# Patient Record
Sex: MALE | Race: WHITE | NOT HISPANIC OR LATINO | Employment: OTHER | URBAN - METROPOLITAN AREA
[De-identification: names, ages, dates, MRNs, and addresses within clinical notes are randomized per-mention and may not be internally consistent; named-entity substitution may affect disease eponyms.]

---

## 2017-01-01 ENCOUNTER — TELEPHONE (OUTPATIENT)
Dept: INTERNAL MEDICINE | Facility: CLINIC | Age: 77
End: 2017-01-01

## 2017-01-01 ENCOUNTER — OFFICE VISIT (OUTPATIENT)
Dept: INTERNAL MEDICINE | Facility: CLINIC | Age: 77
End: 2017-01-01

## 2017-01-01 ENCOUNTER — RESULTS ENCOUNTER (OUTPATIENT)
Dept: INTERNAL MEDICINE | Facility: CLINIC | Age: 77
End: 2017-01-01

## 2017-01-01 VITALS
BODY MASS INDEX: 41.52 KG/M2 | HEIGHT: 70 IN | SYSTOLIC BLOOD PRESSURE: 130 MMHG | HEART RATE: 62 BPM | DIASTOLIC BLOOD PRESSURE: 80 MMHG | OXYGEN SATURATION: 89 % | WEIGHT: 290 LBS

## 2017-01-01 DIAGNOSIS — M54.5 CHRONIC LOW BACK PAIN, UNSPECIFIED BACK PAIN LATERALITY, WITH SCIATICA PRESENCE UNSPECIFIED: Chronic | ICD-10-CM

## 2017-01-01 DIAGNOSIS — F51.04 CHRONIC INSOMNIA: Chronic | ICD-10-CM

## 2017-01-01 DIAGNOSIS — M54.5 CHRONIC LOW BACK PAIN, UNSPECIFIED BACK PAIN LATERALITY, WITH SCIATICA PRESENCE UNSPECIFIED: ICD-10-CM

## 2017-01-01 DIAGNOSIS — E78.5 HYPERLIPIDEMIA, UNSPECIFIED HYPERLIPIDEMIA TYPE: Chronic | ICD-10-CM

## 2017-01-01 DIAGNOSIS — R80.9 MICROALBUMINURIA: Chronic | ICD-10-CM

## 2017-01-01 DIAGNOSIS — J41.1 MUCOPURULENT CHRONIC BRONCHITIS (HCC): Chronic | ICD-10-CM

## 2017-01-01 DIAGNOSIS — G47.33 OBSTRUCTIVE SLEEP APNEA: Chronic | ICD-10-CM

## 2017-01-01 DIAGNOSIS — E55.9 VITAMIN D DEFICIENCY: Chronic | ICD-10-CM

## 2017-01-01 DIAGNOSIS — N40.1 BENIGN NON-NODULAR PROSTATIC HYPERPLASIA WITH LOWER URINARY TRACT SYMPTOMS: Chronic | ICD-10-CM

## 2017-01-01 DIAGNOSIS — E11.9 DIABETIC EYE EXAM (HCC): Chronic | ICD-10-CM

## 2017-01-01 DIAGNOSIS — I27.20 PULMONARY HYPERTENSION (HCC): Chronic | ICD-10-CM

## 2017-01-01 DIAGNOSIS — N18.4 CHRONIC RENAL INSUFFICIENCY, STAGE IV (SEVERE) (HCC): Chronic | ICD-10-CM

## 2017-01-01 DIAGNOSIS — M15.9 GENERALIZED OSTEOARTHRITIS: Chronic | ICD-10-CM

## 2017-01-01 DIAGNOSIS — D75.1 SECONDARY POLYCYTHEMIA: Chronic | ICD-10-CM

## 2017-01-01 DIAGNOSIS — E11.42 DIABETIC PERIPHERAL NEUROPATHY (HCC): Chronic | ICD-10-CM

## 2017-01-01 DIAGNOSIS — E03.9 HYPOTHYROIDISM, UNSPECIFIED TYPE: Chronic | ICD-10-CM

## 2017-01-01 DIAGNOSIS — E11.9 TYPE 2 DIABETES MELLITUS WITHOUT COMPLICATION, WITHOUT LONG-TERM CURRENT USE OF INSULIN (HCC): Primary | Chronic | ICD-10-CM

## 2017-01-01 DIAGNOSIS — G89.29 CHRONIC LOW BACK PAIN, UNSPECIFIED BACK PAIN LATERALITY, WITH SCIATICA PRESENCE UNSPECIFIED: Chronic | ICD-10-CM

## 2017-01-01 DIAGNOSIS — Z01.00 DIABETIC EYE EXAM (HCC): Chronic | ICD-10-CM

## 2017-01-01 DIAGNOSIS — G89.29 CHRONIC LOW BACK PAIN, UNSPECIFIED BACK PAIN LATERALITY, WITH SCIATICA PRESENCE UNSPECIFIED: ICD-10-CM

## 2017-01-01 DIAGNOSIS — R60.0 BILATERAL LOWER EXTREMITY EDEMA: Chronic | ICD-10-CM

## 2017-01-01 DIAGNOSIS — I10 BENIGN ESSENTIAL HYPERTENSION: Chronic | ICD-10-CM

## 2017-01-01 DIAGNOSIS — I87.2 VENOUS INSUFFICIENCY OF BOTH LOWER EXTREMITIES: Chronic | ICD-10-CM

## 2017-01-01 DIAGNOSIS — E11.9 TYPE 2 DIABETES MELLITUS WITHOUT COMPLICATION, WITHOUT LONG-TERM CURRENT USE OF INSULIN (HCC): Chronic | ICD-10-CM

## 2017-01-01 DIAGNOSIS — G47.34 NOCTURNAL HYPOXEMIA: Chronic | ICD-10-CM

## 2017-01-01 LAB
25(OH)D3+25(OH)D2 SERPL-MCNC: 22.7 NG/ML (ref 30–100)
ALBUMIN SERPL-MCNC: 3.6 G/DL (ref 3.5–5.2)
ALBUMIN/GLOB SERPL: 1.4 G/DL
ALP SERPL-CCNC: 38 U/L (ref 39–117)
ALT SERPL-CCNC: 15 U/L (ref 1–41)
AST SERPL-CCNC: 15 U/L (ref 1–40)
BILIRUB SERPL-MCNC: 0.3 MG/DL (ref 0.1–1.2)
BUN SERPL-MCNC: 23 MG/DL (ref 8–23)
BUN/CREAT SERPL: 13.6 (ref 7–25)
CALCIUM SERPL-MCNC: 9.2 MG/DL (ref 8.6–10.5)
CHLORIDE SERPL-SCNC: 99 MMOL/L (ref 98–107)
CHOLEST SERPL-MCNC: 150 MG/DL (ref 100–199)
CK SERPL-CCNC: 50 U/L (ref 20–200)
CO2 SERPL-SCNC: 34.5 MMOL/L (ref 22–29)
CREAT SERPL-MCNC: 1.69 MG/DL (ref 0.76–1.27)
ERYTHROCYTE [DISTWIDTH] IN BLOOD BY AUTOMATED COUNT: 14.7 % (ref 11.5–14.5)
GLOBULIN SER CALC-MCNC: 2.5 GM/DL
GLUCOSE SERPL-MCNC: 182 MG/DL (ref 65–99)
HBA1C MFR BLD: 9.4 % (ref 4.8–5.6)
HCT VFR BLD AUTO: 50.1 % (ref 40.4–52.2)
HDL SERPL-SCNC: 32.8 UMOL/L
HDLC SERPL-MCNC: 40 MG/DL
HGB BLD-MCNC: 15.6 G/DL (ref 13.7–17.6)
LDL SERPL QN: 20.6 NM
LDL SERPL-SCNC: 1649 NMOL/L
LDL SMALL SERPL-SCNC: 939 NMOL/L
LDLC SERPL CALC-MCNC: 76 MG/DL (ref 0–99)
MCH RBC QN AUTO: 30.2 PG (ref 27–32.7)
MCHC RBC AUTO-ENTMCNC: 31.1 G/DL (ref 32.6–36.4)
MCV RBC AUTO: 97.1 FL (ref 79.8–96.2)
PLATELET # BLD AUTO: 170 10*3/MM3 (ref 140–500)
POTASSIUM SERPL-SCNC: 5 MMOL/L (ref 3.5–5.2)
PROT SERPL-MCNC: 6.1 G/DL (ref 6–8.5)
RBC # BLD AUTO: 5.16 10*6/MM3 (ref 4.6–6)
SODIUM SERPL-SCNC: 143 MMOL/L (ref 136–145)
T3FREE SERPL-MCNC: 2.6 PG/ML (ref 2–4.4)
T4 FREE SERPL-MCNC: 1.18 NG/DL (ref 0.93–1.7)
TRIGL SERPL-MCNC: 172 MG/DL (ref 0–149)
TSH SERPL DL<=0.005 MIU/L-ACNC: 4.77 MIU/ML (ref 0.27–4.2)
WBC # BLD AUTO: 7.95 10*3/MM3 (ref 4.5–10.7)

## 2017-01-01 PROCEDURE — 99214 OFFICE O/P EST MOD 30 MIN: CPT | Performed by: INTERNAL MEDICINE

## 2017-01-01 RX ORDER — TIOTROPIUM BROMIDE 18 UG/1
CAPSULE ORAL; RESPIRATORY (INHALATION)
Qty: 30 CAPSULE | Refills: 5 | Status: SHIPPED | OUTPATIENT
Start: 2017-01-01

## 2017-01-01 RX ORDER — LINAGLIPTIN 5 MG/1
TABLET, FILM COATED ORAL
Qty: 90 TABLET | Refills: 0 | Status: SHIPPED | OUTPATIENT
Start: 2017-01-01 | End: 2017-01-01 | Stop reason: SDUPTHER

## 2017-01-01 RX ORDER — HYDROCODONE BITARTRATE AND ACETAMINOPHEN 10; 325 MG/1; MG/1
TABLET ORAL
Qty: 120 TABLET | Refills: 0 | Status: SHIPPED | OUTPATIENT
Start: 2017-01-01

## 2017-01-01 RX ORDER — LINAGLIPTIN 5 MG/1
TABLET, FILM COATED ORAL
Qty: 90 TABLET | Refills: 0 | Status: SHIPPED | OUTPATIENT
Start: 2017-01-01

## 2017-01-01 RX ORDER — EZETIMIBE 10 MG/1
TABLET ORAL
Qty: 90 TABLET | Refills: 0 | Status: SHIPPED | OUTPATIENT
Start: 2017-01-01 | End: 2017-01-01 | Stop reason: SDUPTHER

## 2017-01-01 RX ORDER — EZETIMIBE 10 MG/1
TABLET ORAL
Qty: 90 TABLET | Refills: 0 | Status: SHIPPED | OUTPATIENT
Start: 2017-01-01

## 2017-01-01 RX ORDER — GLIMEPIRIDE 4 MG/1
TABLET ORAL
Qty: 180 TABLET | Refills: 1 | Status: SHIPPED | OUTPATIENT
Start: 2017-01-01

## 2017-01-01 RX ORDER — BLOOD-GLUCOSE METER
EACH MISCELLANEOUS
COMMUNITY
Start: 2017-01-01 | End: 2017-01-01

## 2017-01-01 RX ORDER — LEVOTHYROXINE SODIUM 0.07 MG/1
TABLET ORAL
Qty: 93 TABLET | Refills: 3 | Status: SHIPPED | OUTPATIENT
Start: 2017-01-01

## 2017-01-01 RX ORDER — HYDROCODONE BITARTRATE AND ACETAMINOPHEN 10; 325 MG/1; MG/1
TABLET ORAL
Qty: 120 TABLET | Refills: 0 | Status: SHIPPED | OUTPATIENT
Start: 2017-01-01 | End: 2017-01-01 | Stop reason: SDUPTHER

## 2017-01-01 RX ORDER — METOPROLOL SUCCINATE 50 MG/1
TABLET, EXTENDED RELEASE ORAL
Qty: 90 TABLET | Refills: 1 | Status: SHIPPED | OUTPATIENT
Start: 2017-01-01

## 2017-01-01 RX ORDER — ROSUVASTATIN CALCIUM 40 MG/1
TABLET, COATED ORAL
Qty: 90 TABLET | Refills: 1 | Status: SHIPPED | OUTPATIENT
Start: 2017-01-01

## 2017-01-01 RX ORDER — METOPROLOL SUCCINATE 50 MG/1
TABLET, EXTENDED RELEASE ORAL
Qty: 90 TABLET | Refills: 0 | Status: SHIPPED | OUTPATIENT
Start: 2017-01-01 | End: 2017-01-01 | Stop reason: SDUPTHER

## 2017-02-21 NOTE — TELEPHONE ENCOUNTER
Home health nurse suggest BS meter and test stripes. Can we send that into pharmacy and how many times to check BS. Call Elayne at [phone].

## 2017-02-21 NOTE — TELEPHONE ENCOUNTER
Go ahead and take care of this.  He can check his blood sugar once daily in the morning fasting and check it as needed.

## 2017-02-27 PROBLEM — E11.9 ENCOUNTER FOR DIABETIC FOOT EXAM (HCC): Status: ACTIVE | Noted: 2017-01-01

## 2017-03-07 PROBLEM — E11.9 ENCOUNTER FOR DIABETIC FOOT EXAM (HCC): Chronic | Status: ACTIVE | Noted: 2017-01-01

## 2017-03-07 PROBLEM — S22.41XD CLOSED FRACTURE OF MULTIPLE RIBS OF RIGHT SIDE WITH ROUTINE HEALING: Status: ACTIVE | Noted: 2017-01-01

## 2017-03-07 NOTE — PROGRESS NOTES
03/07/2017    Patient Information  Carlos Chaney                                                                                          [address]  [facility] [address]      1940  [phone]      Chief Complaint:     Follow-up type 2 diabetes, hyperlipidemia, hypothyroidism, microalbuminuria, lower extremity edema and venous insufficiency, generalized osteoarthritis and chronic lower back pain, diabetic peripheral neuropathy, chronic renal insufficiency, COPD, BPH, hypertension, pulmonary hypertension, secondary polycythemia, nocturnal hypoxemia, CINDI, chronic insomnia.  Patient complaining of continued lower back pain.    History of Present Illness:    Patient with a history of multiple medical problems as outlined in the chief complaint that have been fairly stable over the past year.  He presents today for a routine follow-up with lab to monitor his chronic medical issues.  Currently seems to be tolerating his medications well and has no new acute complaints.  Patient has significant chronic lower back pain but is very fearful of needles/injections and refuses epidural injections.  Discussed physical therapy but he really doesn't want to pursue that at this time.  His past medical history extensively reviewed and updated where necessary including his health maintenance parameters.  Because of his fear of needles, multiple issues were declined today.    Review of Systems   Constitution: Negative.   HENT: Negative.    Eyes: Negative.    Cardiovascular: Negative.    Respiratory: Negative.    Endocrine: Negative.    Hematologic/Lymphatic: Negative.    Skin: Negative.    Musculoskeletal: Negative.    Gastrointestinal: Negative.    Genitourinary: Negative.    Neurological: Negative.    Psychiatric/Behavioral: Negative.    Allergic/Immunologic: Negative.        Active Problems:    Patient Active Problem List   Diagnosis   • Allergic rhinitis   • Benign essential hypertension   • Benign  prostatic hypertrophy   • Chronic lower back pain   • COPD   • Chronic renal insufficiency, stage IV (severe), 04/06/2016--creatinine 2.13   • Diabetic peripheral neuropathy   • Generalized osteoarthritis of multiple sites   • Hyperlipidemia   • Hypothyroidism   • Microalbuminuria   • Bilateral lower extremity edema   • Chronic insomnia   • Pulmonary hypertension, 07/01/2014--RVSP 58 mmHg.   • Secondary polycythemia   • Nocturnal hypoxemia   • Type 2 diabetes mellitus   • Vitamin D deficiency   • Therapeutic drug monitoring   • Obstructive sleep apnea, 04/17/2007--AHI 84.2.  Patient cannot tolerate CPAP.  Oxygen 2 L per nasal cannula.   • Venous insufficiency of both lower extremities   • Diabetic eye exam   • Diabetic foot exam         Past Medical History   Diagnosis Date   • History of aortic valve stenosis    • History of chest x-ray 06/30/2014 06/30/2014--chest x-ray performed 4 possible congestive heart failure. It reveals mild hydrostatic edema, pulmonary edema. Small pleural effusions noted. 09/18/2008--chest x-ray reveals no active disease.   • History of complete arterial study of extremity 08/26/2005 08/26/2005--lower extremity Doppler arterial study reveals right ankle-brachial index, posterior tibial 1.12. Right ankle-brachial index dorsalis pedis 0.98. Left ankle brachial index, posterior tibial 1.14. Left ankle brachial index dorsalis pedis 1.03.   • History of echocardiogram 07/01/2016 07/01/2014--echocardiogram performed for congestive heart failure. Left ventricular chamber size is normal. Mild to moderate concentric left ventricular hypertrophy. Normal ejection fraction 55-60%. Impaired left ventricular relaxation. Left atrium is mild moderately dilated. No evidence of atrial septal aneurysm. Right ventricular cavity size is mildly enlarged. Right ventricular global systolic    • History of Inflamed skin tag 05/28/2014 05/28/2014--patient has an extremely large skin tag on his  right anterior thigh. This was anesthetized and removed with scissors and the base of the lesion was electrocauterized.   • History of Multiple actinic keratoses 2/9/2015 02/09/2015--patient had a total of 5 actinic keratoses that were destroyed with liquid nitrogen.  One was located on the ulnar aspect of the left wrist, 3 on the left forearm, one on the superior aspect of the left ear.   • History of multiple right rib fractures 11/22/2016 11/22/2016--patient fell 2-3 weeks ago and landed on the right anterior chest wall.  Since that time he has had significant pain that is worse with certain movements.  He is concerned of the rib fracture.  Examination reveals no tenderness to palpation over this area.  Breath sounds are present.  Chest x-ray with right rib details ordered.CONCLUSION: 1. Fracture of the right 9th, 10th and 11th ribs. 2. Small associated right-sided pleural effusion.   • Right-sided chest wall pain 11/22/2016 11/22/2016--patient fell 2-3 weeks ago and landed on the right anterior chest wall.  Since that time he has had significant pain that is worse with certain movements.  He is concerned of the rib fracture.  Examination reveals no tenderness to palpation over this area.  Breath sounds are present.  Chest x-ray with right rib details ordered.CONCLUSION: 1. Fracture of the right 9th, 10th and 11th ribs. 2. Small associated right-sided pleural effusion.         Past Surgical History   Procedure Laterality Date   • Skin surgery Right 05/28/2014 05/28/2014--patient has an extremely large inflamed skin tag on his right anterior thigh. This was anesthetized and removed with scissors and the base of the lesion was electrocauterized.   • Shoulder surgery Right 1998 1998--right shoulder surgery to resect a bone spur causing impingement syndrome.   • Inguinal hernia repair Bilateral 1972 1972-bilateral inguinal hernia repair.   • Thoracic disc surgery  1992 1992--herniated  nucleus pulposus of the very upper back/lower cervical vertebrae.   • Vasectomy  1972 1972--elective vasectomy   • Cataract extraction Right 11/20/2015 11/20/2015--right cataract extirpation with intraocular lens implantation. 11/13/2015-left cataract extirpation with intraocular lens implantation   • Colonoscopy  REFUSES     Refuses colonoscopy   • Cataract extraction Left 11/13/2015 11/13/2015--left cataract extraction with intraocular lens implantation.         No Known Allergies        Current Outpatient Prescriptions:   •  aspirin 325 MG tablet, Take 1 tablet by mouth daily., Disp: , Rfl:   •  Blood Glucose Monitoring Suppl (ONE TOUCH ULTRA 2) W/DEVICE kit, , Disp: , Rfl:   •  Cholecalciferol (VITAMIN D) 2000 UNITS capsule, Take 1 tablet by mouth 2 (two) times a day., Disp: , Rfl:   •  CRESTOR 40 MG tablet, TAKE 1 TABLET BY MOUTH EVERY DAY, Disp: 90 tablet, Rfl: 1  •  fenofibrate (TRICOR) 145 MG tablet, TAKE 1 TABLET BY MOUTH EVERY DAY FOR CHOLESTEROL, Disp: 90 tablet, Rfl: 1  •  glimepiride (AMARYL) 4 MG tablet, TAKE 1 TABLET BY MOUTH TWICE DAILY FOR DIABETES, Disp: 180 tablet, Rfl: 1  •  HYDROcodone-acetaminophen (NORCO)  MG per tablet, One to 2 by mouth every 6 hours when necessary pain., Disp: 120 tablet, Rfl: 0  •  levothyroxine (SYNTHROID) 50 MCG tablet, One by mouth daily for thyroid., Disp: 90 tablet, Rfl: 3  •  metoprolol succinate XL (TOPROL-XL) 50 MG 24 hr tablet, TAKE 1 TABLET BY MOUTH EVERY DAY, Disp: 90 tablet, Rfl: 0  •  Misc Natural Products (OSTEO BI-FLEX ADV DOUBLE ST PO), Take 1 tablet by mouth daily., Disp: , Rfl:   •  Multiple Vitamin (MULTI-VITAMINS PO), Take 1 tablet by mouth daily., Disp: , Rfl:   •  ONE TOUCH ULTRA TEST test strip, , Disp: , Rfl:   •  ONETOUCH DELICA LANCETS FINE misc, , Disp: , Rfl:   •  pioglitazone (ACTOS) 45 MG tablet, TAKE 1 TABLET BY MOUTH EVERY DAY, Disp: 90 tablet, Rfl: 1  •  PROVENTIL  (90 BASE) MCG/ACT inhaler, INHALE 2 PUFFS FOUR  "TIMES DAILY AS NEEDED, Disp: 8 g, Rfl: 5  •  tiotropium (SPIRIVA) 18 MCG per inhalation capsule, 1 capsule daily., Disp: , Rfl:   •  TRADJENTA 5 MG tablet tablet, TAKE 1 TABLET BY MOUTH EVERY DAY, Disp: 90 tablet, Rfl: 0  •  ZETIA 10 MG tablet, TAKE 1 TABLET BY MOUTH EVERY DAY FOR CHOLESTEROL, Disp: 90 tablet, Rfl: 0  •  zolpidem (AMBIEN) 10 MG tablet, One by mouth daily at bedtime when necessary sleep, Disp: 30 tablet, Rfl: 2      Family History   Problem Relation Age of Onset   • Kidney failure Mother      On Hemodialysis.   • Alzheimer's disease Father    • Hypertension Other          Social History     Social History   • Marital status:      Spouse name: N/A   • Number of children: N/A   • Years of education: N/A     Occupational History   • Maintenance at [employer]    • Retired      Social History Main Topics   • Smoking status: Former Smoker     Quit date: 11/1/2013   • Smokeless tobacco: Never Used   • Alcohol use Yes      Comment: Socially   • Drug use: No   • Sexual activity: Not Currently     Partners: Female     Other Topics Concern   • Not on file     Social History Narrative         Vitals:    03/07/17 0855   BP: 130/80   Pulse: 62   SpO2: (!) 89%   Weight: 290 lb (132 kg)   Height: 69.5\" (176.5 cm)          Physical Exam:    General: Alert and oriented x 3. Obese.   No acute distress.  Normal affect.  HEENT: Pupils equal, round, reactive to light; extraocular movements intact; sclerae nonicteric; pharynx, ear canals and TMs normal.  Neck: Without JVD, thyromegaly, bruit, or adenopathy.  Lungs: Clear to auscultation in all fields, but his breath sounds are somewhat diminished bilaterally.  Heart: Regular rate and rhythm without murmur, rub, gallop, or click.  Abdomen: Soft, nontender, without hepatosplenomegaly or hernia.  Bowel sounds normal.  : Deferred.  Rectal: Deferred.  Extremities: Without clubbing, cyanosis, or pulse deficit.  Bilateral 2+ pedal edema to above the " calves.  Neurologic: Intact without focal deficit.  Normal station and gait observed during ingress and egress from the examination room.  Skin: Without significant lesion.  Musculoskeletal: Unremarkable.      Lab/other results:    NMR reveals total cholesterol 250.  Triglycerides mildly elevated at 172.  LDL particle number elevated at 1649.  Small LDL particle number elevated at 939.  HDL particle number normal at 38.2.  CMP normal except blood sugar elevated at 182.  Creatinine is 1.69.  CBC is essentially normal.  Hemoglobin A1c 9.4.  Thyroid function tests are abnormal.  His TSH is elevated at 4.77.  Vitamin D low at 22.7.  CPK normal.    Assessment/Plan:     Diagnosis Plan   1. Type 2 diabetes mellitus without complication, without long-term current use of insulin     2. Hyperlipidemia, unspecified hyperlipidemia type     3. Hypothyroidism, unspecified type     4. Microalbuminuria     5. Bilateral lower extremity edema     6. Generalized osteoarthritis of multiple sites     7. Diabetic peripheral neuropathy     8. Chronic renal insufficiency, stage IV (severe), 04/06/2016--creatinine 2.13     9. COPD     10. Chronic low back pain, unspecified back pain laterality, with sciatica presence unspecified     11. Benign prostatic hypertrophy     12. Benign essential hypertension     13. Pulmonary hypertension, 07/01/2014--RVSP 58 mmHg.     14. Secondary polycythemia     15. Nocturnal hypoxemia     16. Obstructive sleep apnea, 04/17/2007--AHI 84.2.  Patient cannot tolerate CPAP.  Oxygen 2 L per nasal cannula.     17. Venous insufficiency of both lower extremities     18. Diabetic eye exam     19. Vitamin D deficiency     20. Chronic insomnia         Patient has type 2 diabetes that is not at goal.  However, patient has extreme fear of needles and refuses insulin or the Victoza.  I've asked him to try to work on his diet and weight loss.  Hyperlipidemia is not under as good a control as was previously and patient  reports he may have missed a few doses of his medication.  In regards to the hyperlipidemia, we need to discontinue the fenofibrate due to the recent medical evidence regarding risks versus benefits.  Thyroid is off a little and medication adjustment indicated.  He has microalbuminuria that will be reassessed at next visit.  His lower extremity edema is under reasonable control.  Patient cannot tolerate compression stockings.  His COPD seems stable.  Chronic renal insufficiency has actually improved.  BPH is not much of an issue.  Patient does have secondary polycythemia that has improved with nocturnal oxygen therapy.  He has CINDI and cannot tolerate CPAP.  Patient reports she is scheduled for a routine diabetic eye examination in the near future.  I'm asking him to have his ophthalmologist send me a report.  His vitamin D is low and I've recommended that he take vitamin D supplementation.  Continues to have problems with chronic insomnia.    Plan is as follows: Increase levothyroxine to 75 µg per day.  Discontinue fenofibrate.  Refill hydrocodone.  Vitamin D 5000 units per day.  Patient will follow-up in 6 months with lab prior.          Procedures